# Patient Record
Sex: MALE | Race: OTHER | NOT HISPANIC OR LATINO
[De-identification: names, ages, dates, MRNs, and addresses within clinical notes are randomized per-mention and may not be internally consistent; named-entity substitution may affect disease eponyms.]

---

## 2022-06-20 ENCOUNTER — TRANSCRIPTION ENCOUNTER (OUTPATIENT)
Age: 82
End: 2022-06-20

## 2022-06-21 ENCOUNTER — APPOINTMENT (OUTPATIENT)
Dept: DISASTER EMERGENCY | Facility: HOSPITAL | Age: 82
End: 2022-06-21

## 2022-06-21 ENCOUNTER — OUTPATIENT (OUTPATIENT)
Dept: OUTPATIENT SERVICES | Facility: HOSPITAL | Age: 82
LOS: 1 days | End: 2022-06-21

## 2022-06-21 VITALS
WEIGHT: 199.96 LBS | DIASTOLIC BLOOD PRESSURE: 84 MMHG | RESPIRATION RATE: 18 BRPM | HEART RATE: 85 BPM | SYSTOLIC BLOOD PRESSURE: 160 MMHG | HEIGHT: 68 IN | OXYGEN SATURATION: 97 % | TEMPERATURE: 98 F

## 2022-06-21 VITALS
RESPIRATION RATE: 18 BRPM | HEART RATE: 72 BPM | SYSTOLIC BLOOD PRESSURE: 132 MMHG | TEMPERATURE: 98 F | DIASTOLIC BLOOD PRESSURE: 74 MMHG | OXYGEN SATURATION: 98 %

## 2022-06-21 DIAGNOSIS — U07.1 COVID-19: ICD-10-CM

## 2022-06-21 RX ORDER — BEBTELOVIMAB 87.5 MG/ML
175 INJECTION, SOLUTION INTRAVENOUS ONCE
Refills: 0 | Status: COMPLETED | OUTPATIENT
Start: 2022-06-21 | End: 2022-06-21

## 2022-06-21 RX ADMIN — BEBTELOVIMAB 175 MILLIGRAM(S): 87.5 INJECTION, SOLUTION INTRAVENOUS at 14:50

## 2022-06-21 NOTE — CHART NOTE - NSCHARTNOTEFT_GEN_A_CORE
CC: Monoclonal Antibody Administration/COVID 19 Positive      History: Patient presents for administration of monoclonal antibody  Patient has been screened and was deemed to be a candidate.    Exposure: unknown    Symptoms/ Criteria: cough sore throat cough headache    Inclusion Criteria: age > 80 years  HTN DM    Date of Positive Test: verified by clinical call center     Date of symptom onset:  6/20    Vaccine status: Pfizer x 3 last 5/21    PMHx:  Infection due to severe acute respiratory syndrome coronavirus 2 (SARS-CoV-2)          T(C): 36.6 (06-21-22 @ 15:05), Max: 36.6 (06-21-22 @ 15:05)  HR: 68 (06-21-22 @ 15:05) (68 - 85)  BP: 124/66 (06-21-22 @ 15:05) (124/66 - 160/84)  RR: 18 (06-21-22 @ 15:05) (18 - 18)  SpO2: 98% (06-21-22 @ 15:05) (97% - 98%)      PE:   Appearance: NAD	  HEENT:   Normal oral mucosa.   Lymphatic: No lymphadenopathy  Cardiovascular: Normal S1 S2, No JVD, No murmurs, No edema  Respiratory: Lungs clear to auscultation	  Gastrointestinal:  Soft, Non-tender. No guarding   Skin: warm and dry  Neurologic: Non-focal  Extremities: Normal range of motion.     ASSESSMENT:  Pt is a 82y year old Male with PMH HTN  DM   Covid +  referred to the infusion center for Monoclonal antibody administration  (Bebtelovimab).        PLAN:  - Administration procedure explained to patient   - Consent for monoclonal antibody administration obtained   - Risk & benefits discussed/all questions answered  - Administer Bebtelovimab per protocol  - will observe patient for one hour post administration  and then if stable discharge home with outpt follow up as planned by PMD.        - Patient tolerated treatment well denies complaints of chest pain/SOB/dizziness/ palpitations  - VSS for discharge home  - D/C instructions given/ fact sheet included.  - Patient to follow-up with PCP as needed.